# Patient Record
Sex: MALE
[De-identification: names, ages, dates, MRNs, and addresses within clinical notes are randomized per-mention and may not be internally consistent; named-entity substitution may affect disease eponyms.]

---

## 2023-04-20 PROBLEM — Z00.00 ENCOUNTER FOR PREVENTIVE HEALTH EXAMINATION: Status: ACTIVE | Noted: 2023-04-20

## 2023-04-21 ENCOUNTER — APPOINTMENT (OUTPATIENT)
Dept: PULMONOLOGY | Facility: CLINIC | Age: 58
End: 2023-04-21
Payer: COMMERCIAL

## 2023-04-21 VITALS
RESPIRATION RATE: 16 BRPM | DIASTOLIC BLOOD PRESSURE: 78 MMHG | OXYGEN SATURATION: 98 % | BODY MASS INDEX: 25.66 KG/M2 | HEART RATE: 81 BPM | HEIGHT: 65 IN | WEIGHT: 154 LBS | SYSTOLIC BLOOD PRESSURE: 126 MMHG

## 2023-04-21 PROCEDURE — 99203 OFFICE O/P NEW LOW 30 MIN: CPT

## 2023-04-21 RX ORDER — METFORMIN HYDROCHLORIDE 500 MG/1
500 TABLET, COATED ORAL
Refills: 0 | Status: ACTIVE | COMMUNITY

## 2023-04-21 NOTE — CONSULT LETTER
[Dear  ___] : Dear  [unfilled], [Consult Letter:] : I had the pleasure of evaluating your patient, [unfilled]. [Please see my note below.] : Please see my note below. [Consult Closing:] : Thank you very much for allowing me to participate in the care of this patient.  If you have any questions, please do not hesitate to contact me. [Sincerely,] : Sincerely, [DrMoi  ___] : Dr. MARINO

## 2023-04-24 LAB
ACE BLD-CCNC: 29 U/L
M TB IFN-G BLD-IMP: NEGATIVE
QUANTIFERON TB PLUS MITOGEN MINUS NIL: 6.31 IU/ML
QUANTIFERON TB PLUS NIL: 0.02 IU/ML
QUANTIFERON TB PLUS TB1 MINUS NIL: 0 IU/ML
QUANTIFERON TB PLUS TB2 MINUS NIL: 0.01 IU/ML

## 2023-05-03 ENCOUNTER — APPOINTMENT (OUTPATIENT)
Dept: NUCLEAR MEDICINE | Facility: CLINIC | Age: 58
End: 2023-05-03
Payer: COMMERCIAL

## 2023-05-03 ENCOUNTER — OUTPATIENT (OUTPATIENT)
Dept: OUTPATIENT SERVICES | Facility: HOSPITAL | Age: 58
LOS: 1 days | End: 2023-05-03

## 2023-05-03 DIAGNOSIS — R91.8 OTHER NONSPECIFIC ABNORMAL FINDING OF LUNG FIELD: ICD-10-CM

## 2023-05-03 PROCEDURE — 78815 PET IMAGE W/CT SKULL-THIGH: CPT | Mod: 26,PI

## 2023-05-05 NOTE — DISCUSSION/SUMMARY
[FreeTextEntry1] : Assess\par \par Most likely benign post inflammatory nodules \par Latent TB or Sarcoid not excluded\par \par Plan\par \par PET CT - BX if +\par ACE and Quantiferon TB serology\par One month FU

## 2023-05-05 NOTE — HISTORY OF PRESENT ILLNESS
[TextBox_4] : Multiple pulmonary nodules - mostly with calcifications found on FU of CXR\par 1 pack per week smoker - max 10 years\par \par No exposure, arthritis, rash, sweats , cough, dyspnea\par 12 lb weight loss since Dx of DM2

## 2023-05-15 ENCOUNTER — APPOINTMENT (OUTPATIENT)
Dept: THORACIC SURGERY | Facility: CLINIC | Age: 58
End: 2023-05-15
Payer: COMMERCIAL

## 2023-05-15 VITALS
OXYGEN SATURATION: 98 % | HEART RATE: 64 BPM | SYSTOLIC BLOOD PRESSURE: 131 MMHG | WEIGHT: 152 LBS | BODY MASS INDEX: 25.33 KG/M2 | RESPIRATION RATE: 16 BRPM | DIASTOLIC BLOOD PRESSURE: 79 MMHG | TEMPERATURE: 98.2 F | HEIGHT: 65 IN

## 2023-05-15 DIAGNOSIS — Z78.9 OTHER SPECIFIED HEALTH STATUS: ICD-10-CM

## 2023-05-15 DIAGNOSIS — F17.200 NICOTINE DEPENDENCE, UNSPECIFIED, UNCOMPLICATED: ICD-10-CM

## 2023-05-15 DIAGNOSIS — Z83.3 FAMILY HISTORY OF DIABETES MELLITUS: ICD-10-CM

## 2023-05-15 PROCEDURE — 99204 OFFICE O/P NEW MOD 45 MIN: CPT

## 2023-05-15 RX ORDER — CHOLECALCIFEROL (VITAMIN D3) 25 MCG
TABLET ORAL
Refills: 0 | Status: ACTIVE | COMMUNITY

## 2023-05-15 RX ORDER — PSYLLIUM HUSK 0.4 G
CAPSULE ORAL
Refills: 0 | Status: ACTIVE | COMMUNITY

## 2023-05-15 NOTE — REVIEW OF SYSTEMS
[Negative] : Heme/Lymph [Fever] : no fever [Chills] : no chills [Feeling Poorly] : not feeling poorly [Feeling Tired] : not feeling tired [Chest Pain] : no chest pain [Palpitations] : no palpitations [Shortness Of Breath] : no shortness of breath [Wheezing] : no wheezing [Cough] : no cough [SOB on Exertion] : no shortness of breath during exertion

## 2023-05-15 NOTE — CONSULT LETTER
[Dear  ___] : Dear  [unfilled], [Consult Letter:] : I had the pleasure of evaluating your patient, [unfilled]. [Please see my note below.] : Please see my note below. [Consult Closing:] : Thank you very much for allowing me to participate in the care of this patient.  If you have any questions, please do not hesitate to contact me. [Sincerely,] : Sincerely, [FreeTextEntry2] : Dr. Emery Shields MD [FreeTextEntry3] : Bj Mehta MD\par Cardiovascular and Thoracic Surgery\par [facility]\par [address] [place] [address]\[place] [address]\par Tel. [phone]\par Fax [phone]\par

## 2023-05-15 NOTE — HISTORY OF PRESENT ILLNESS
[FreeTextEntry1] : Александр Alba is a 58 year old male who presents for consultation, referred by Dr. Shields, for evaluation of pulmonary nodules.\par \par Past medical history includes former smoker (stopped 2022), diabetes mellitus type 2.\par \par Today the patient reports the he "feels fine." He denies chest pain, palpitations, shortness of breath, cough, fevers, chills, fatigue, unintentional weight loss or gain, and night sweats.

## 2023-05-15 NOTE — ASSESSMENT
[FreeTextEntry1] : Александр is a 58-year-old male with 2 calcified nodules 1 in the left upper lobe 1 in the right middle lobe of unclear etiology.  I do believe further investigation is appropriate and I will be arranging a robotic bronchoscopy to access at least the right-sided nodule.  He will be scheduled shortly.\par \par Thank you for allowing me to participate in the care of your patient.\par \par 45 minutes was spent during this encounter.\par \par \par Bj Mehta MD\par Department of Cardiovascular and Thoracic Surgery\par \par [employer]\par [employer] [facility] [employer] [facility]\par

## 2023-05-15 NOTE — DATA REVIEWED
[FreeTextEntry1] : PETCT [facility] ONC FDG INIT  - ORDERED BY: MIROSLAVA SMALL\par PROCEDURE DATE:  05/03/2023\par \par INTERPRETATION:  CLINICAL INFORMATION: Pulmonary calcified and noncalcified lung nodules (RML 2.4 cm with calcifications and KEILA 1.7 cm with calcifications); negative Quantiferon+TB and ACE tests 4/22/2023.\par \par TREATMENT STRATEGY EVALUATION: Initial\par FASTING BLOOD SUGAR: 82 mg/dL\par RADIOPHARMACEUTICAL:  10.61 mCi F-18, FDG, I.V.\par UPTAKE PERIOD: 50 minutes\par SCANNER: [employer] 710\par ORAL CONTRAST: Patient drank OMNIPAQUE contrast during the uptake period.\par PHARMACOLOGIC INTERVENTION: None.\par \par TECHNIQUE: Following intravenous injection of radiopharmaceutical and above uptake period, PET/CT was obtained from base of skull  to mid-thigh. CT protocol was optimized for PET attenuation correction and anatomic localization and was not designed to produce and cannot replace state-of-the-art diagnostic CT images with specific imaging protocols for different body parts and indications. Images were reconstructed and reviewed in axial, coronal and sagittal views and three-dimensional MIP.\par \par The standardized uptake values (SUV) are normalized to patient body weight and indicate the highest activity concentration (SUVmax) in a given site. All image numbers refer to axial image number.\par \par COMPARISON:  No prior FDG-PET/CT.\par \par OTHER STUDIES USED FOR CORRELATION: None\par \par FINDINGS:\par \par HEAD/NECK: Physiologic FDG activity in visualized brain.\par -Symmetric activity in the tonsils likely reflecting lymphoid tissue uptake (SUV 6.8 in the right and 7.0 in the left)\par \par THORAX: Nonspecific bilateral hilar activity showing SUV 2.2 in the right and 2.1 in the left. No hypermetabolic mediastinal nodes. Nonavid soft tissue changes anterior mediastinum (image 74).\par \par LUNGS: Right middle lobe nodule with scattered coarse calcific densities (image 77) measures 2.4 x 1.4 cm with SUV 3.5; considerations include inflammatory (granulomatous process) versus neoplastic process. Although located more medially, this nodule has partial lobulated appearance and possibility of large cell neuroendocrine carcinoma is raised.\par -Left upper lobe paramediastinal nodule with denser calcifications throughout (image 74) measures approximately 1.8 x 1.2 cm with SUV 1.8.\par -Several subcentimeter calcified nodules measuring up to 0.6 cm right upper (image 70) or left upper lobe (image 57) are nonavid.\par -Scattered noncalcified subcentimeter nonavid lung nodules measuring up to 0.6 cm such as seen in the left lung (images 56, 58, 68 or 97) and right lung (images 53, 57, 62 or 71).\par -Biapical lung scarring\par \par PLEURA/PERICARDIUM: No abnormal FDG activity. No effusion.\par \par HEPATOBILIARY/PANCREAS: Physiologic FDG activity.  For reference, normal liver demonstrates SUV mean 2.0.\par \par SPLEEN: Physiologic FDG activity. Normal in size.\par \par ADRENAL GLANDS: No abnormal FDG activity. No nodule.\par \par KIDNEYS/URINARY BLADDER: Physiologic excreted FDG activity.\par \par REPRODUCTIVE ORGANS: No abnormal FDG activity. Prostate gland measures 4.9 cm in transverse dimension\par \par ABDOMINOPELVIC LYMPH NODES/RETROPERITONEUM: No enlarged or FDG-avid lymph node.\par \par ESOPHAGUS/STOMACH/BOWEL/PERITONEUM/MESENTERY: No abnormal FDG activity.\par \par VESSELS: Unremarkable.\par \par BONES/SOFT TISSUES: No abnormal FDG activity.\par \par IMPRESSION:\par 1. Larger right middle lobe nodule with scattered coarse calcific densities showing increased activity; considerations include inflammatory versus neoplastic process. Tissue evaluation recommended. Other smaller left upper lobe paramediastinal nodule with denser calcifications show low grade activity. Scattered nonavid subcentimeter calcified and noncalcified nodules.\par \par 2. No other suspicious FDG avid malignant lesion in the remaining field-of-view.\par \par --- End of Report ---\par \par JANESSA ROBLES MD; Attending Radiologist\par \par \par \par \par \par \par \par \par CT CHEST NON CONTRAST at [facility] on 2/20/23:\par IMPRESSION:\par Two indeterminate large lung lesions containing multiple calcifications, measuring 2.4cm in the right middle lobe and 1.7cm in the left upper lobe. Multiple additional small calcified and noncalcified nodules. Consider PET/CT.

## 2023-05-23 ENCOUNTER — TRANSCRIPTION ENCOUNTER (OUTPATIENT)
Age: 58
End: 2023-05-23

## 2023-05-23 ENCOUNTER — APPOINTMENT (OUTPATIENT)
Dept: PULMONOLOGY | Facility: CLINIC | Age: 58
End: 2023-05-23
Payer: COMMERCIAL

## 2023-05-23 VITALS
BODY MASS INDEX: 25.33 KG/M2 | HEART RATE: 69 BPM | HEIGHT: 65 IN | OXYGEN SATURATION: 98 % | SYSTOLIC BLOOD PRESSURE: 130 MMHG | WEIGHT: 152 LBS | RESPIRATION RATE: 16 BRPM | DIASTOLIC BLOOD PRESSURE: 68 MMHG

## 2023-05-23 PROCEDURE — 99213 OFFICE O/P EST LOW 20 MIN: CPT

## 2023-05-23 NOTE — HISTORY OF PRESENT ILLNESS
[TextBox_4] : Multiple pulmonary nodules - mostly with calcifications found on FU of CXR\par 1 pack per week smoker - max 10 years\par \par No exposure, arthritis, rash, sweats , cough, dyspnea\par 12 lb weight loss since Dx of DM2\par \par 5/23/23\par No further weight loss\par Bronchoscopy by Dr Mehta tomorrow\par Questions answered - possible diagnoses reviewed \par

## 2023-05-23 NOTE — DISCUSSION/SUMMARY
[FreeTextEntry1] : Assess\par \par Most likely benign post inflammatory nodules \par Latent TB or Sarcoid not excluded\par \par Plan\par \par Bronchoscopy\par 2 month FU with full PFT

## 2023-05-24 ENCOUNTER — OUTPATIENT (OUTPATIENT)
Dept: OUTPATIENT SERVICES | Facility: HOSPITAL | Age: 58
LOS: 1 days | End: 2023-05-24
Payer: COMMERCIAL

## 2023-05-24 ENCOUNTER — APPOINTMENT (OUTPATIENT)
Dept: THORACIC SURGERY | Facility: HOSPITAL | Age: 58
End: 2023-05-24

## 2023-05-24 ENCOUNTER — RESULT REVIEW (OUTPATIENT)
Age: 58
End: 2023-05-24

## 2023-05-24 DIAGNOSIS — R91.8 OTHER NONSPECIFIC ABNORMAL FINDING OF LUNG FIELD: ICD-10-CM

## 2023-05-24 DIAGNOSIS — E11.9 TYPE 2 DIABETES MELLITUS WITHOUT COMPLICATIONS: ICD-10-CM

## 2023-05-24 LAB
B PERT IGG+IGM PNL SER: ABNORMAL
COLOR FLD: SIGNIFICANT CHANGE UP
EOSINOPHIL # FLD: 1 % — SIGNIFICANT CHANGE UP
FLUID INTAKE SUBSTANCE CLASS: SIGNIFICANT CHANGE UP
GLUCOSE BLDC GLUCOMTR-MCNC: 96 MG/DL — SIGNIFICANT CHANGE UP (ref 70–99)
GRAM STN FLD: SIGNIFICANT CHANGE UP
LYMPHOCYTES # FLD: 5 % — SIGNIFICANT CHANGE UP
MONOS+MACROS # FLD: 11 % — SIGNIFICANT CHANGE UP
NEUTROPHILS-BODY FLUID: 83 % — SIGNIFICANT CHANGE UP
NIGHT BLUE STAIN TISS: SIGNIFICANT CHANGE UP
RCV VOL RI: 154 /UL — HIGH (ref 0–0)
SPECIMEN SOURCE FLD: SIGNIFICANT CHANGE UP
SPECIMEN SOURCE: SIGNIFICANT CHANGE UP
SPECIMEN SOURCE: SIGNIFICANT CHANGE UP
TOTAL NUCLEATED CELL COUNT, BODY FLUID: 12 /UL — SIGNIFICANT CHANGE UP
TUBE TYPE: SIGNIFICANT CHANGE UP

## 2023-05-24 PROCEDURE — 87594 PNEUMCYSTS JIROVECII AMP PRB: CPT

## 2023-05-24 PROCEDURE — 88172 CYTP DX EVAL FNA 1ST EA SITE: CPT

## 2023-05-24 PROCEDURE — 31624 DX BRONCHOSCOPE/LAVAGE: CPT

## 2023-05-24 PROCEDURE — 88173 CYTOPATH EVAL FNA REPORT: CPT | Mod: 26,59

## 2023-05-24 PROCEDURE — 31629 BRONCHOSCOPY/NEEDLE BX EACH: CPT

## 2023-05-24 PROCEDURE — 87116 MYCOBACTERIA CULTURE: CPT

## 2023-05-24 PROCEDURE — 31623 DX BRONCHOSCOPE/BRUSH: CPT

## 2023-05-24 PROCEDURE — 88172 CYTP DX EVAL FNA 1ST EA SITE: CPT | Mod: 26,59

## 2023-05-24 PROCEDURE — 71045 X-RAY EXAM CHEST 1 VIEW: CPT | Mod: 26

## 2023-05-24 PROCEDURE — 88104 CYTOPATH FL NONGYN SMEARS: CPT

## 2023-05-24 PROCEDURE — C9399: CPT

## 2023-05-24 PROCEDURE — 88305 TISSUE EXAM BY PATHOLOGIST: CPT

## 2023-05-24 PROCEDURE — 88305 TISSUE EXAM BY PATHOLOGIST: CPT | Mod: 26

## 2023-05-24 PROCEDURE — 87015 SPECIMEN INFECT AGNT CONCNTJ: CPT

## 2023-05-24 PROCEDURE — S2900: CPT

## 2023-05-24 PROCEDURE — 87206 SMEAR FLUORESCENT/ACID STAI: CPT

## 2023-05-24 PROCEDURE — S2900 ROBOTIC SURGICAL SYSTEM: CPT | Mod: NC

## 2023-05-24 PROCEDURE — 31625 BRONCHOSCOPY W/BIOPSY(S): CPT | Mod: 59

## 2023-05-24 PROCEDURE — 31652 BRONCH EBUS SAMPLNG 1/2 NODE: CPT

## 2023-05-24 PROCEDURE — 71045 X-RAY EXAM CHEST 1 VIEW: CPT

## 2023-05-24 PROCEDURE — 31627 NAVIGATIONAL BRONCHOSCOPY: CPT

## 2023-05-24 PROCEDURE — 88173 CYTOPATH EVAL FNA REPORT: CPT

## 2023-05-24 PROCEDURE — 82962 GLUCOSE BLOOD TEST: CPT

## 2023-05-24 PROCEDURE — 31628 BRONCHOSCOPY/LUNG BX EACH: CPT

## 2023-05-24 PROCEDURE — 88104 CYTOPATH FL NONGYN SMEARS: CPT | Mod: 26,59

## 2023-05-24 PROCEDURE — 76000 FLUOROSCOPY <1 HR PHYS/QHP: CPT

## 2023-05-24 PROCEDURE — 87102 FUNGUS ISOLATION CULTURE: CPT

## 2023-05-24 PROCEDURE — 88112 CYTOPATH CELL ENHANCE TECH: CPT

## 2023-05-24 PROCEDURE — 89051 BODY FLUID CELL COUNT: CPT

## 2023-05-24 PROCEDURE — 87070 CULTURE OTHR SPECIMN AEROBIC: CPT

## 2023-05-24 PROCEDURE — 88112 CYTOPATH CELL ENHANCE TECH: CPT | Mod: 26,59

## 2023-05-24 NOTE — H&P ADULT - NSHPREVIEWOFSYSTEMS_GEN_ALL_CORE
Review of Systems  CONSTITUTIONAL:  Fevers[ ] chills[ ] sweats[ ] fatigue[ ] weight loss[ ] weight gain [ ]                                     NEGATIVE [ ]   NEURO:  parathesias[ ] seizures [ ]  syncope [ ]  confusion [ ]                                                                                NEGATIVE[ ]     ENMT:  difficulty hearing [ ]  vertigo[ ]  dysphagia[ ] epistaxis[ ] recent dental work [ ]                                    NEGATIVE[x ]   CV:  chest pain[ ] palpitations[ ] BLACK [ ] diaphoresis [ ]                                                                                           NEGATIVE[x ]   RESPIRATORY:  wheezing[ ] SOB[ ] cough [ ] sputum[ ] hemoptysis[ ]                                                                  NEGATIVE[x ]   GI:  nausea[ ]  vommiting [ ]  diarrhea[ ] constipation [ ] melena [ ]                                                                      NEGATIVE[x ]   : hematuria[ ]  dysuria[ ] urgency[ ] incontinence[ ]                                                                                            NEGATIVE[x]   MUSKULOSKELETAL:  arthritis[ ]  joint swelling [ ] muscle weakness [ ]                                                                NEGATIVE[x ]   SKIN/BREAST:  rash[ ] itching [ ]  hair loss[ ] masses[ ]                                                                                              NEGATIVE[x ]   PSYCH:  dementia [ ] depresion [ ] anxiety[ ]                                                                                                               NEGATIVE[x ]   HEME/LYMPH:  bruises easily[ ] enlarged lymph nodes[ ] tender lymph nodes[ ]                                               NEGATIVE[x ]   ENDOCRINE:  cold intolerance[ ] heat intolerance[ ] polydipsia[ ]                                                                          NEGATIVE[x ]

## 2023-05-24 NOTE — H&P ADULT - PROBLEM SELECTOR PLAN 1
1. ION Bronch today.  2. Post procedure CXR.  3. Post op visit with Dr. Mehta in 2 weeks.  4. F/u as outpatient w/ Dr. Shields ; Pulmonary.

## 2023-05-24 NOTE — H&P ADULT - NSHPPHYSICALEXAM_GEN_ALL_CORE
T(C): --  HR: --  BP: --  RR: --  SpO2: --    CONSTITUTIONAL: Well groomed, no apparent distress  EYES: PERRLA and symmetric, EOMI, No conjunctival or scleral injection, non-icteric  ENMT: Oral mucosa with moist membranes. Normal dentition; no pharyngeal injection or exudates             NECK: Supple, symmetric and without tracheal deviation   RESP: No respiratory distress, no use of accessory muscles; CTA b/l, no WRR  CV: RRR, +S1S2, no MRG; no JVD; no peripheral edema  GI: Soft, NT, ND, no rebound, no guarding; no palpable masses; no hepatosplenomegaly; no hernia palpated  LYMPH: No cervical LAD or tenderness; no axillary LAD or tenderness; no inguinal LAD or tenderness  MSK: Normal gait; No digital clubbing or cyanosis; examination of the (head/neck/spine/ribs/pelvis, RUE, LUE, RLE, LLE) without misalignment,            Normal ROM without pain, no spinal tenderness, normal muscle strength/tone  SKIN: No rashes or ulcers noted; no subcutaneous nodules or induration palpable  NEURO: CN II-XII intact; normal reflexes in upper and lower extremities, sensation intact in upper and lower extremities b/l to light touch   PSYCH: Appropriate insight/judgment; A+O x 3, mood and affect appropriate, recent/remote memory intact

## 2023-05-24 NOTE — H&P ADULT - HISTORY OF PRESENT ILLNESS
Mr. Alba is a 57 y/o male presents as ambulatory patient who was seen by Dr. Mehta in outpatient follow up referred by Dr. Shields for multiple lung lesions/nodules seen on CXR/CT of the chest. Patient has h/o former smoker 1 ppack per week x 10 years.  Pt has recently been diagnosed with DM 2.  He was referred for PET imaging which revealed metabolic activity and now referred for biopsies of lesions/nodules to obtain tissue diagnosis.  Pt denies any recent active infectious process or productive cough.

## 2023-05-26 LAB
CULTURE RESULTS: NO GROWTH — SIGNIFICANT CHANGE UP
P JIROVECII DNA L RESP QL NAA+NON-PROBE: NEGATIVE — SIGNIFICANT CHANGE UP
SPECIMEN SOURCE: SIGNIFICANT CHANGE UP
SPECIMEN SOURCE: SIGNIFICANT CHANGE UP

## 2023-05-30 LAB — NON-GYNECOLOGICAL CYTOLOGY STUDY: SIGNIFICANT CHANGE UP

## 2023-06-12 ENCOUNTER — APPOINTMENT (OUTPATIENT)
Dept: THORACIC SURGERY | Facility: CLINIC | Age: 58
End: 2023-06-12
Payer: COMMERCIAL

## 2023-06-24 LAB
CULTURE RESULTS: SIGNIFICANT CHANGE UP
SPECIMEN SOURCE: SIGNIFICANT CHANGE UP

## 2023-06-26 ENCOUNTER — APPOINTMENT (OUTPATIENT)
Dept: THORACIC SURGERY | Facility: CLINIC | Age: 58
End: 2023-06-26
Payer: COMMERCIAL

## 2023-06-26 VITALS
HEIGHT: 65 IN | HEART RATE: 68 BPM | WEIGHT: 152 LBS | OXYGEN SATURATION: 99 % | DIASTOLIC BLOOD PRESSURE: 81 MMHG | TEMPERATURE: 97.9 F | RESPIRATION RATE: 16 BRPM | SYSTOLIC BLOOD PRESSURE: 124 MMHG | BODY MASS INDEX: 25.33 KG/M2

## 2023-06-26 DIAGNOSIS — Z87.891 PERSONAL HISTORY OF NICOTINE DEPENDENCE: ICD-10-CM

## 2023-06-26 PROCEDURE — 99214 OFFICE O/P EST MOD 30 MIN: CPT

## 2023-06-26 RX ORDER — OMEGA-3/DHA/EPA/FISH OIL 300-1000MG
CAPSULE ORAL
Refills: 0 | Status: ACTIVE | COMMUNITY

## 2023-06-26 NOTE — ASSESSMENT
[FreeTextEntry1] : Александр is a 58-year-old male with several lung nodules who recently underwent robotic bronchoscopy that showed noncaseating granulomas and calcification.  I have asked him to follow-up with pulmonary medicine for further recommendations but I suspect surveillance will be all that is necessary going forward.\par \par Thank you for allowing me to participate in the care of your patient.\par \par 30 minutes was spent during this encounter.\par \par Bj Mehta MD\par Department of Cardiovascular and Thoracic Surgery\par \par [employer]\[facility] [employer] [facility]\par

## 2023-06-26 NOTE — CONSULT LETTER
[FreeTextEntry2] : Dr. Shields [FreeTextEntry3] : Bj Mehta MD\par Department of Cardiovascular and Thoracic Surgery \par  \par Cardiovascular & Thoracic Surgery\par [facility] [employer] [facility]\par \par [facility]\par [address] [place] [address]\par [place], [place] [address]\par [phone] Tel\par [phone] Fax\par

## 2023-06-26 NOTE — DATA REVIEWED
[FreeTextEntry1] : Accession:                             [id number]\par Collected Date/Time:                   5/24/2023 17:38 EDT\par Received Date/Time:                    5/24/2023 17:38 EDT\par \par Fine Needle Aspiration Report - Auth (Verified)\par \par Specimen(s) Submitted\par 1. LUNG, RIGHT MIDDLE LOBE, ROBOTIC ASSISTED (ION) FNA  T  FORCEPS BIOPSY\par 2. LUNG, RIGHT MIDDLE LOBE, BRONCHIAL BRUSH\par 3. LUNG, BRONCHOALVEOLAR LAVAGE\par \par Final Diagnosis\par 1. LUNG, RIGHT MIDDLE LOBE, ROBOTIC ASSISTED (ION) FNA  T  FORCEPS BIOPSY\par NEGATIVE FOR MALIGNANT CELLS.\par \par Non-necrotizing granulomas with calcification.\par \par Cytology slides show a hypocellular specimen composed of rare clusters of\par epithelioid histiocytes in a background of multinucleated giant cells,\par rare reactive ciliated  and blood.\par Cell block is non-contributory.\par Core biopsy reveals calcified non-necrotizing granulomas and chronic\par inflammation.\par Special stains for AFB and fungi are in progress and will be reported\par separately.\par \par 2. LUNG, RIGHT MIDDLE LOBE, BRONCHIAL BRUSH\par NEGATIVE FOR MALIGNANT CELLS.\par \par Cytology slide and cell block show scattered bronchial cells and alveolar\par macrophages.\par \par 3. LUNG, BRONCHOALVEOLAR LAVAGE\par NEGATIVE FOR MALIGNANT CELLS.\par \par Cytology slide and cell block show a cellular specimen composed of groups\par of reactive ciliated bronchial  cells, alveolar macrophages and mixed\par inflammatory cells.\par \par Screened by: Sharmaine Harmon CT(ASCP)\par Verified by: JENNY SUTTON MD\par (Electronic Signature)\par \par \par \par \par \par \par \par \par \par PETCT SKUL-THI ONC FDG INIT  - ORDERED BY: MIROSLAVA SMALL\par PROCEDURE DATE:  05/03/2023\par \par INTERPRETATION:  CLINICAL INFORMATION: Pulmonary calcified and noncalcified lung nodules (RML 2.4 cm with calcifications and KEILA 1.7 cm with calcifications); negative Quantiferon+TB and ACE tests 4/22/2023.\par \par TREATMENT STRATEGY EVALUATION: Initial\par FASTING BLOOD SUGAR: 82 mg/dL\par RADIOPHARMACEUTICAL:  10.61 mCi F-18, FDG, I.V.\par UPTAKE PERIOD: 50 minutes\par SCANNER: [employer]\par ORAL CONTRAST: Patient drank OMNIPAQUE contrast during the uptake period.\par PHARMACOLOGIC INTERVENTION: None.\par \par TECHNIQUE: Following intravenous injection of radiopharmaceutical and above uptake period, PET/CT was obtained from base of skull  to mid-thigh. CT protocol was optimized for PET attenuation correction and anatomic localization and was not designed to produce and cannot replace state-of-the-art diagnostic CT images with specific imaging protocols for different body parts and indications. Images were reconstructed and reviewed in axial, coronal and sagittal views and three-dimensional MIP.\par \par The standardized uptake values (SUV) are normalized to patient body weight and indicate the highest activity concentration (SUVmax) in a given site. All image numbers refer to axial image number.\par \par COMPARISON:  No prior FDG-PET/CT.\par \par OTHER STUDIES USED FOR CORRELATION: None\par \par FINDINGS:\par \par HEAD/NECK: Physiologic FDG activity in visualized brain.\par -Symmetric activity in the tonsils likely reflecting lymphoid tissue uptake (SUV 6.8 in the right and 7.0 in the left)\par \par THORAX: Nonspecific bilateral hilar activity showing SUV 2.2 in the right and 2.1 in the left. No hypermetabolic mediastinal nodes. Nonavid soft tissue changes anterior mediastinum (image 74).\par \par LUNGS: Right middle lobe nodule with scattered coarse calcific densities (image 77) measures 2.4 x 1.4 cm with SUV 3.5; considerations include inflammatory (granulomatous process) versus neoplastic process. Although located more medially, this nodule has partial lobulated appearance and possibility of large cell neuroendocrine carcinoma is raised.\par -Left upper lobe paramediastinal nodule with denser calcifications throughout (image 74) measures approximately 1.8 x 1.2 cm with SUV 1.8.\par -Several subcentimeter calcified nodules measuring up to 0.6 cm right upper (image 70) or left upper lobe (image 57) are nonavid.\par -Scattered noncalcified subcentimeter nonavid lung nodules measuring up to 0.6 cm such as seen in the left lung (images 56, 58, 68 or 97) and right lung (images 53, 57, 62 or 71).\par -Biapical lung scarring\par \par PLEURA/PERICARDIUM: No abnormal FDG activity. No effusion.\par \par HEPATOBILIARY/PANCREAS: Physiologic FDG activity.  For reference, normal liver demonstrates SUV mean 2.0.\par \par SPLEEN: Physiologic FDG activity. Normal in size.\par \par ADRENAL GLANDS: No abnormal FDG activity. No nodule.\par \par KIDNEYS/URINARY BLADDER: Physiologic excreted FDG activity.\par \par REPRODUCTIVE ORGANS: No abnormal FDG activity. Prostate gland measures 4.9 cm in transverse dimension\par \par ABDOMINOPELVIC LYMPH NODES/RETROPERITONEUM: No enlarged or FDG-avid lymph node.\par \par ESOPHAGUS/STOMACH/BOWEL/PERITONEUM/MESENTERY: No abnormal FDG activity.\par \par VESSELS: Unremarkable.\par \par BONES/SOFT TISSUES: No abnormal FDG activity.\par \par IMPRESSION:\par 1. Larger right middle lobe nodule with scattered coarse calcific densities showing increased activity; considerations include inflammatory versus neoplastic process. Tissue evaluation recommended. Other smaller left upper lobe paramediastinal nodule with denser calcifications show low grade activity. Scattered nonavid subcentimeter calcified and noncalcified nodules.\par \par 2. No other suspicious FDG avid malignant lesion in the remaining field-of-view.\par \par --- End of Report ---\par \par JANESSA ROBLES MD; Attending Radiologist

## 2023-06-26 NOTE — HISTORY OF PRESENT ILLNESS
[FreeTextEntry1] : Mr. Alba, a 58-year-old male, former smoker (quit in 2022), is here today for a follow-up after undergoing an ION biopsy for right middle lobe. The final pathology report confirmed the absence of malignant cells. \par \par Past medical history includes former smoker (stopped 2022), diabetes mellitus type 2.\par \par Today the patient reports that he feels well. He denies chest pain, palpitations, shortness of breath, cough, fevers, chills, fatigue, unintentional weight loss or gain, and night sweats. \par \par \par Referring Dr. Shields

## 2023-06-26 NOTE — REVIEW OF SYSTEMS
[Negative] : Heme/Lymph [Fever] : no fever [Chills] : no chills [Feeling Poorly] : not feeling poorly [Feeling Tired] : not feeling tired [Chest Pain] : no chest pain [Palpitations] : no palpitations [Lower Ext Edema] : no extremity edema [Shortness Of Breath] : no shortness of breath [Wheezing] : no wheezing [Cough] : no cough [SOB on Exertion] : no shortness of breath during exertion

## 2023-07-12 LAB
CULTURE RESULTS: SIGNIFICANT CHANGE UP
SPECIMEN SOURCE: SIGNIFICANT CHANGE UP

## 2023-08-03 ENCOUNTER — APPOINTMENT (OUTPATIENT)
Dept: PULMONOLOGY | Facility: CLINIC | Age: 58
End: 2023-08-03
Payer: COMMERCIAL

## 2023-08-03 ENCOUNTER — TRANSCRIPTION ENCOUNTER (OUTPATIENT)
Age: 58
End: 2023-08-03

## 2023-08-03 VITALS — BODY MASS INDEX: 25.49 KG/M2 | WEIGHT: 153 LBS | HEIGHT: 65 IN

## 2023-08-03 VITALS
OXYGEN SATURATION: 95 % | SYSTOLIC BLOOD PRESSURE: 124 MMHG | DIASTOLIC BLOOD PRESSURE: 78 MMHG | RESPIRATION RATE: 16 BRPM | HEART RATE: 67 BPM

## 2023-08-03 PROCEDURE — 94010 BREATHING CAPACITY TEST: CPT

## 2023-08-03 PROCEDURE — 99213 OFFICE O/P EST LOW 20 MIN: CPT | Mod: 25

## 2023-08-03 PROCEDURE — 85018 HEMOGLOBIN: CPT | Mod: QW

## 2023-08-03 PROCEDURE — 94729 DIFFUSING CAPACITY: CPT

## 2023-08-03 PROCEDURE — 94727 GAS DIL/WSHOT DETER LNG VOL: CPT

## 2023-08-03 RX ORDER — DOXYCYCLINE HYCLATE 100 MG/1
100 CAPSULE ORAL
Qty: 28 | Refills: 1 | Status: ACTIVE | COMMUNITY
Start: 2023-08-03 | End: 1900-01-01

## 2023-08-03 NOTE — DISCUSSION/SUMMARY
[FreeTextEntry1] : Assess  Most likely benign post inflammatory nodules  Latent TB or Sarcoid  excluded Normal lung function   Plan  Doxycycline  FU CT November with clinical FU thereafter

## 2023-08-03 NOTE — HISTORY OF PRESENT ILLNESS
[TextBox_4] : Multiple pulmonary nodules - mostly with calcifications found on FU of CXR 1 pack per week smoker - max 10 years  No exposure, arthritis, rash, sweats , cough, dyspnea 12 lb weight loss since Dx of DM2  5/23/23 No further weight loss Bronchoscopy by Dr Mehta tomorrow Questions answered - possible diagnoses reviewed   8/3/23 Bronch 5/24/23 No malignant cell, Cultures negative including Mycobacterium Feels fine No exposures

## 2023-09-02 ENCOUNTER — APPOINTMENT (OUTPATIENT)
Dept: CT IMAGING | Facility: CLINIC | Age: 58
End: 2023-09-02

## 2023-09-02 ENCOUNTER — OUTPATIENT (OUTPATIENT)
Dept: OUTPATIENT SERVICES | Facility: HOSPITAL | Age: 58
LOS: 1 days | End: 2023-09-02
Payer: COMMERCIAL

## 2023-09-02 DIAGNOSIS — R91.8 OTHER NONSPECIFIC ABNORMAL FINDING OF LUNG FIELD: ICD-10-CM

## 2023-09-02 PROCEDURE — 71250 CT THORAX DX C-: CPT | Mod: 26

## 2023-12-05 ENCOUNTER — APPOINTMENT (OUTPATIENT)
Dept: PULMONOLOGY | Facility: CLINIC | Age: 58
End: 2023-12-05
Payer: COMMERCIAL

## 2023-12-05 VITALS
HEART RATE: 81 BPM | SYSTOLIC BLOOD PRESSURE: 140 MMHG | DIASTOLIC BLOOD PRESSURE: 88 MMHG | OXYGEN SATURATION: 97 % | RESPIRATION RATE: 16 BRPM

## 2023-12-05 VITALS — BODY MASS INDEX: 26.66 KG/M2 | HEIGHT: 65 IN | WEIGHT: 160 LBS

## 2023-12-05 DIAGNOSIS — R91.8 OTHER NONSPECIFIC ABNORMAL FINDING OF LUNG FIELD: ICD-10-CM

## 2023-12-05 PROCEDURE — 99213 OFFICE O/P EST LOW 20 MIN: CPT

## 2024-06-05 ENCOUNTER — APPOINTMENT (OUTPATIENT)
Dept: PULMONOLOGY | Facility: CLINIC | Age: 59
End: 2024-06-05

## (undated) DEVICE — DRAPE 3/4 SHEET 52X76"

## (undated) DEVICE — BRUSH CYTO DISP

## (undated) DEVICE — ION BIOPSY NEEDLE 21G

## (undated) DEVICE — WARMING BLANKET LOWER ADULT

## (undated) DEVICE — VALVE SUCTION EVIS 160/200/240

## (undated) DEVICE — ION BIOPSY NEEDLE 23G

## (undated) DEVICE — TUBING CONNECTING 6MM 20FT

## (undated) DEVICE — GLV 7.5 PROTEXIS (WHITE)

## (undated) DEVICE — TRAP SPECIMEN SPUTUM 40CC

## (undated) DEVICE — SOL IRR POUR H2O 1000ML

## (undated) DEVICE — DRAPE XL SHEET 77X98"

## (undated) DEVICE — VISITEC 4X4

## (undated) DEVICE — SYR CONTROL LUER LOK 10CC

## (undated) DEVICE — FORCEP RADIAL JAW 4 PEDIATRIC W NDL 1.8MM 2MM 160CM DISP

## (undated) DEVICE — VENODYNE/SCD SLEEVE CALF MEDIUM

## (undated) DEVICE — SOL IRR POUR NS 0.9% 1000ML

## (undated) DEVICE — VALVE BIOPSY BRONCHOVIDEOSCOPE